# Patient Record
Sex: FEMALE | Race: WHITE | Employment: FULL TIME | URBAN - METROPOLITAN AREA
[De-identification: names, ages, dates, MRNs, and addresses within clinical notes are randomized per-mention and may not be internally consistent; named-entity substitution may affect disease eponyms.]

---

## 2017-10-20 ENCOUNTER — APPOINTMENT (OUTPATIENT)
Dept: GENERAL RADIOLOGY | Facility: CLINIC | Age: 47
End: 2017-10-20
Attending: EMERGENCY MEDICINE
Payer: COMMERCIAL

## 2017-10-20 ENCOUNTER — HOSPITAL ENCOUNTER (EMERGENCY)
Facility: CLINIC | Age: 47
Discharge: HOME | End: 2017-10-20
Attending: EMERGENCY MEDICINE | Admitting: EMERGENCY MEDICINE
Payer: COMMERCIAL

## 2017-10-20 VITALS
SYSTOLIC BLOOD PRESSURE: 131 MMHG | BODY MASS INDEX: 30.24 KG/M2 | HEART RATE: 90 BPM | DIASTOLIC BLOOD PRESSURE: 90 MMHG | RESPIRATION RATE: 16 BRPM | TEMPERATURE: 98.3 F | HEIGHT: 59 IN | OXYGEN SATURATION: 99 % | WEIGHT: 150 LBS

## 2017-10-20 DIAGNOSIS — R07.89 ATYPICAL CHEST PAIN: ICD-10-CM

## 2017-10-20 LAB
ANION GAP SERPL CALCULATED.3IONS-SCNC: 6 MMOL/L (ref 3–14)
BASOPHILS # BLD AUTO: 0.1 10E9/L (ref 0–0.2)
BASOPHILS NFR BLD AUTO: 0.6 %
BUN SERPL-MCNC: 17 MG/DL (ref 7–30)
CALCIUM SERPL-MCNC: 8.9 MG/DL (ref 8.5–10.1)
CHLORIDE SERPL-SCNC: 102 MMOL/L (ref 94–109)
CO2 SERPL-SCNC: 28 MMOL/L (ref 20–32)
CREAT SERPL-MCNC: 0.78 MG/DL (ref 0.52–1.04)
D DIMER PPP FEU-MCNC: <0.3 UG/ML FEU (ref 0–0.5)
DIFFERENTIAL METHOD BLD: ABNORMAL
EOSINOPHIL # BLD AUTO: 0.1 10E9/L (ref 0–0.7)
EOSINOPHIL NFR BLD AUTO: 0.8 %
ERYTHROCYTE [DISTWIDTH] IN BLOOD BY AUTOMATED COUNT: 13.6 % (ref 10–15)
GFR SERPL CREATININE-BSD FRML MDRD: 80 ML/MIN/1.7M2
GLUCOSE SERPL-MCNC: 116 MG/DL (ref 70–99)
HCT VFR BLD AUTO: 36.8 % (ref 35–47)
HGB BLD-MCNC: 12.3 G/DL (ref 11.7–15.7)
IMM GRANULOCYTES # BLD: 0.1 10E9/L (ref 0–0.4)
IMM GRANULOCYTES NFR BLD: 0.4 %
LYMPHOCYTES # BLD AUTO: 4.6 10E9/L (ref 0.8–5.3)
LYMPHOCYTES NFR BLD AUTO: 35.2 %
MCH RBC QN AUTO: 29.9 PG (ref 26.5–33)
MCHC RBC AUTO-ENTMCNC: 33.4 G/DL (ref 31.5–36.5)
MCV RBC AUTO: 89 FL (ref 78–100)
MONOCYTES # BLD AUTO: 1.3 10E9/L (ref 0–1.3)
MONOCYTES NFR BLD AUTO: 9.6 %
NEUTROPHILS # BLD AUTO: 7 10E9/L (ref 1.6–8.3)
NEUTROPHILS NFR BLD AUTO: 53.4 %
NRBC # BLD AUTO: 0 10*3/UL
NRBC BLD AUTO-RTO: 0 /100
PLATELET # BLD AUTO: 318 10E9/L (ref 150–450)
POTASSIUM SERPL-SCNC: 4.2 MMOL/L (ref 3.4–5.3)
RBC # BLD AUTO: 4.12 10E12/L (ref 3.8–5.2)
SODIUM SERPL-SCNC: 136 MMOL/L (ref 133–144)
TROPONIN I SERPL-MCNC: <0.015 UG/L (ref 0–0.04)
WBC # BLD AUTO: 13 10E9/L (ref 4–11)

## 2017-10-20 PROCEDURE — 25000128 H RX IP 250 OP 636: Performed by: EMERGENCY MEDICINE

## 2017-10-20 PROCEDURE — 80048 BASIC METABOLIC PNL TOTAL CA: CPT | Performed by: EMERGENCY MEDICINE

## 2017-10-20 PROCEDURE — 85379 FIBRIN DEGRADATION QUANT: CPT | Performed by: EMERGENCY MEDICINE

## 2017-10-20 PROCEDURE — 99284 EMERGENCY DEPT VISIT MOD MDM: CPT | Mod: 25

## 2017-10-20 PROCEDURE — 96374 THER/PROPH/DIAG INJ IV PUSH: CPT

## 2017-10-20 PROCEDURE — 25000132 ZZH RX MED GY IP 250 OP 250 PS 637: Performed by: EMERGENCY MEDICINE

## 2017-10-20 PROCEDURE — 71020 XR CHEST 2 VW: CPT

## 2017-10-20 PROCEDURE — 85025 COMPLETE CBC W/AUTO DIFF WBC: CPT | Performed by: EMERGENCY MEDICINE

## 2017-10-20 PROCEDURE — 84484 ASSAY OF TROPONIN QUANT: CPT | Performed by: EMERGENCY MEDICINE

## 2017-10-20 PROCEDURE — 93005 ELECTROCARDIOGRAM TRACING: CPT

## 2017-10-20 RX ORDER — KETOROLAC TROMETHAMINE 15 MG/ML
15 INJECTION, SOLUTION INTRAMUSCULAR; INTRAVENOUS ONCE
Status: COMPLETED | OUTPATIENT
Start: 2017-10-20 | End: 2017-10-20

## 2017-10-20 RX ORDER — ASPIRIN 325 MG
325 TABLET ORAL ONCE
Status: COMPLETED | OUTPATIENT
Start: 2017-10-20 | End: 2017-10-20

## 2017-10-20 RX ADMIN — KETOROLAC TROMETHAMINE 15 MG: 15 INJECTION, SOLUTION INTRAMUSCULAR; INTRAVENOUS at 21:32

## 2017-10-20 RX ADMIN — ASPIRIN 325 MG ORAL TABLET 325 MG: 325 PILL ORAL at 21:32

## 2017-10-20 ASSESSMENT — ENCOUNTER SYMPTOMS
COUGH: 1
WHEEZING: 1

## 2017-10-20 NOTE — ED AVS SNAPSHOT
[facility] Emergency Department    [address]    [facility] [address]    Phone:  [phone]    Fax:  [phone]                                       Maria Del Rosario Rod   MRN: [id number]    Department:  [facility] Emergency Department   Date of Visit:  10/20/2017           After Visit Summary Signature Page     I have received my discharge instructions, and my questions have been answered. I have discussed any challenges I see with this plan with the nurse or doctor.    ..........................................................................................................................................  Patient/Patient Representative Signature      ..........................................................................................................................................  Patient Representative Print Name and Relationship to Patient    ..................................................               ................................................  Date                                            Time    ..........................................................................................................................................  Reviewed by Signature/Title    ...................................................              ..............................................  Date                                                            Time

## 2017-10-20 NOTE — ED AVS SNAPSHOT
[facility] Emergency Department    [address] [place] [address]    Phone:  [phone]    Fax:  [phone]                                       Maria Del Rosario Rod   MRN: [id number]    Department:  [facility] Emergency Department   Date of Visit:  10/20/2017           Patient Information     Date Of Birth          1970        Your diagnoses for this visit were:     Atypical chest pain        You were seen by Ronnell Rand MD.      Follow-up Information     Call Jack Carbajal NP.    Specialty:  Nurse Practitioner    Why:  call Monday AM to discuss ED visit and follow-up     Contact information:    [facility] [place]  [address] [place] [address]  [phone]          Follow up with [facility] Emergency Department.    Specialty:  EMERGENCY MEDICINE    Why:  As needed, If symptoms worsen    Contact information:    [address] [place] [id number] [phone]        Discharge Instructions         Chest Wall Pain: Costochondritis    The chest pain that you have had today is caused by costochondritis. This condition is caused by an inflammation of the cartilage joining your ribs to your breastbone. It is not caused by heart or lung problems. Your healthcare team has made sure that the chest pain you feel is not from a life threatening cause of chest pain such as heart attack, collapsed lung, blood clot in the lung, tear in the aorta, or esophageal rupture. The inflammation may have been brought on by a blow to the chest, lifting heavy objects, intense exercise, or an illness that made you cough and sneeze a lot. It often occurs during times of emotional stress. It can be painful, but it is not dangerous. It usually goes away in 1 to 2 weeks. But it may happen again. Rarely, a more serious condition may cause symptoms similar to costochondritis. That s why it s important to watch for the warning signs  listed below.  Home care  Follow these guidelines when caring for yourself at home:    If you feel that emotional stress is a cause of your condition, try to figure out the sources of that stress. It may not be obvious. Learn ways to deal with the stress in your life. This can include regular exercise, muscle relaxation, meditation, or simply taking time out for yourself.    You may use acetaminophen, ibuprofen, or naproxen to control pain, unless another pain medicine was prescribed. If you have liver or kidney disease or ever had a stomach ulcer, talk with your healthcare provider before using these medicines.    You can also help ease pain by using a hot, wet compress or heating pad. Use this with or without a medicated skin cream that helps relieves pain.    Do stretching exercise as advised by your provider.    Take any prescribed medicines as directed.  Follow-up care  Follow up with your healthcare provider, or as advised, if you do not start to get better in the next 2 days.  When to seek medical advice  Call your healthcare provider right away if any of these occur:    A change in the type of pain. Call if it feels different, becomes more serious, lasts longer, or spreads into your shoulder, arm, neck, jaw, or back.    Shortness of breath or pain gets worse when you breathe    Weakness, dizziness, or fainting    Cough with dark-colored sputum (phlegm) or blood    Abdominal pain    Dark red or black stools    Fever of 100.4 F (38 C) or higher, or as directed by your healthcare provider  Date Last Reviewed: 12/1/2016 2000-2017 [employer]. [address] [place] [address], [place] [address]. All rights reserved. This information is not intended as a substitute for professional medical care. Always follow your healthcare professional's instructions.          24 Hour Appointment Hotline       To make an appointment at any [facility], call [phone] ([phone]). If you don't have a family  doctor or clinic, we will help you find one. [facility] (formerly [facility]) are conveniently located to serve the needs of you and your family.             Review of your medicines      Our records show that you are taking the medicines listed below. If these are incorrect, please call your family doctor or clinic.        Dose / Directions Last dose taken    * albuterol (2.5 MG/3ML) 0.083% neb solution   Dose:  1 ampule        Take 1 ampule by nebulization every 6 hours as needed   Refills:  0        * ALBUTEROL INHALER 17GM   Dose:  2 puff        Inhale 2 puffs into the lungs every 4 hours as needed This product no longer available   Refills:  0        ESTERIFIED ESTROGENS PO   Dose:  1 mg        Take 1 mg by mouth   Refills:  0        HYDROcodone-acetaminophen 5-325 MG per tablet   Commonly known as:  NORCO   Dose:  1-2 tablet   Quantity:  30 tablet        Take 1-2 tablets by mouth every 4 hours as needed for moderate to severe pain   Refills:  0        IMITREX PO   Dose:  50 mg   Indication:  Migraine Headache        Take 50 mg by mouth once as needed   Refills:  0        Multi-vitamin Tabs tablet   Dose:  1 tablet        Take 1 tablet by mouth daily   Refills:  0        NEURONTIN PO   Dose:  300 mg        Take 300 mg by mouth daily   Refills:  0        PROTONIX 20 MG EC tablet   Dose:  20 mg   Generic drug:  pantoprazole        Take 20 mg by mouth 2 times daily   Refills:  0        PROZAC PO   Dose:  40 mg        Take 40 mg by mouth daily   Refills:  0        TRAZODONE HCL PO   Dose:  50 mg        Take 50 mg by mouth At Bedtime   Refills:  0        * UNKNOWN TO PATIENT        Antibiotic for UTI   Refills:  0        * UNKNOWN TO PATIENT        Estrogen   Refills:  0        XANAX PO   Dose:  0.25 mg        Take 0.25 mg by mouth every 4 hours as needed for anxiety   Refills:  0        * Notice:  This list has 4 medication(s) that are the same as other medications prescribed for you. Read the directions carefully, and ask your  doctor or other care provider to review them with you.            Procedures and tests performed during your visit     Basic metabolic panel    CBC with platelets differential    Chest XR,  PA & LAT    D dimer quantitative    EKG 12 lead    Troponin I      Orders Needing Specimen Collection     None      Pending Results     Date and Time Order Name Status Description    10/20/2017 2048 EKG 12 lead Preliminary             Pending Culture Results     No orders found from 10/18/2017 to 10/21/2017.            Pending Results Instructions     If you had any lab results that were not finalized at the time of your Discharge, you can call the ED Lab Result RN at [phone]. You will be contacted by this team for any positive Lab results or changes in treatment. The nurses are available 7 days a week from 10A to 6:30P.  You can leave a message 24 hours per day and they will return your call.        Test Results From Your Hospital Stay        10/20/2017  9:41 PM      Component Results     Component Value Ref Range & Units Status    WBC 13.0 (H) 4.0 - 11.0 10e9/L Final    RBC Count 4.12 3.8 - 5.2 10e12/L Final    Hemoglobin 12.3 11.7 - 15.7 g/dL Final    Hematocrit 36.8 35.0 - 47.0 % Final    MCV 89 78 - 100 fl Final    MCH 29.9 26.5 - 33.0 pg Final    MCHC 33.4 31.5 - 36.5 g/dL Final    RDW 13.6 10.0 - 15.0 % Final    Platelet Count 318 150 - 450 10e9/L Final    Diff Method Automated Method  Final    % Neutrophils 53.4 % Final    % Lymphocytes 35.2 % Final    % Monocytes 9.6 % Final    % Eosinophils 0.8 % Final    % Basophils 0.6 % Final    % Immature Granulocytes 0.4 % Final    Nucleated RBCs 0 0 /100 Final    Absolute Neutrophil 7.0 1.6 - 8.3 10e9/L Final    Absolute Lymphocytes 4.6 0.8 - 5.3 10e9/L Final    Absolute Monocytes 1.3 0.0 - 1.3 10e9/L Final    Absolute Eosinophils 0.1 0.0 - 0.7 10e9/L Final    Absolute Basophils 0.1 0.0 - 0.2 10e9/L Final    Abs Immature Granulocytes 0.1 0 - 0.4 10e9/L Final    Absolute  Nucleated RBC 0.0  Final         10/20/2017  9:59 PM      Component Results     Component Value Ref Range & Units Status    Sodium 136 133 - 144 mmol/L Final    Potassium 4.2 3.4 - 5.3 mmol/L Final    Chloride 102 94 - 109 mmol/L Final    Carbon Dioxide 28 20 - 32 mmol/L Final    Anion Gap 6 3 - 14 mmol/L Final    Glucose 116 (H) 70 - 99 mg/dL Final    Urea Nitrogen 17 7 - 30 mg/dL Final    Creatinine 0.78 0.52 - 1.04 mg/dL Final    GFR Estimate 80 >60 mL/min/1.7m2 Final    Non  GFR Calc    GFR Estimate If Black >90 >60 mL/min/1.7m2 Final    African American GFR Calc    Calcium 8.9 8.5 - 10.1 mg/dL Final         10/20/2017  9:59 PM      Component Results     Component Value Ref Range & Units Status    Troponin I ES <0.015 0.000 - 0.045 ug/L Final    The 99th percentile for upper reference range is 0.045 ug/L.  Troponin values   in the range of 0.045 - 0.120 ug/L may be associated with risks of adverse   clinical events.           10/20/2017  9:58 PM      Component Results     Component Value Ref Range & Units Status    D Dimer <0.3 0.0 - 0.50 ug/ml FEU Final    This D-dimer assay is intended for use in conjunction with a clinical pretest   probability assessment model to exclude pulmonary embolism (PE) and deep   venous thrombosis (DVT) in outpatients suspected of PE or DVT. The cut-off   value is 0.5 ug/mL FEU.           10/20/2017 10:30 PM      Narrative     XR CHEST 2 VW   10/20/2017 10:24 PM     HISTORY: Chest pain.    COMPARISON: 9/23/2015.    FINDINGS: Negative chest. No interval change.        Impression     IMPRESSION: Negative.    WILL GRAY MD                Clinical Quality Measure: Blood Pressure Screening     Your blood pressure was checked while you were in the emergency department today. The last reading we obtained was  BP: 134/88 . Please read the guidelines below about what these numbers mean and what you should do about them.  If your systolic blood pressure (the top number)  "is less than 120 and your diastolic blood pressure (the bottom number) is less than 80, then your blood pressure is normal. There is nothing more that you need to do about it.  If your systolic blood pressure (the top number) is 120-139 or your diastolic blood pressure (the bottom number) is 80-89, your blood pressure may be higher than it should be. You should have your blood pressure rechecked within a year by a primary care provider.  If your systolic blood pressure (the top number) is 140 or greater or your diastolic blood pressure (the bottom number) is 90 or greater, you may have high blood pressure. High blood pressure is treatable, but if left untreated over time it can put you at risk for heart attack, stroke, or kidney failure. You should have your blood pressure rechecked by a primary care provider within the next 4 weeks.  If your provider in the emergency department today gave you specific instructions to follow-up with your doctor or provider even sooner than that, you should follow that instruction and not wait for up to 4 weeks for your follow-up visit.        Thank you for choosing [facility]       Thank you for choosing [facility] for your care. Our goal is always to provide you with excellent care. Hearing back from our patients is one way we can continue to improve our services. Please take a few minutes to complete the written survey that you may receive in the mail after you visit with us. Thank you!        [employer] Information     [employer] lets you send messages to your doctor, view your test results, renew your prescriptions, schedule appointments and more. To sign up, go to [email or web address] . Click on \"Log in\" on the left side of the screen, which will take you to the Welcome page. Then click on \"Sign up Now\" on the right side of the page.     You will be asked to enter the access code listed below, as well as some personal information. Please follow the directions to create your username " and password.     Your access code is: [email or web address]  Expires: 2018 10:50 PM     Your access code will  in 90 days. If you need help or a new code, please call your [facility] clinic or [phone].        Care EveryWhere ID     This is your Care EveryWhere ID. This could be used by other organizations to access your [facility] medical records  [id number]        Equal Access to Services     ENZO HAAS : Hadii jose flannery hadasho Soomaali, waaxda luqadaha, qaybta kaalmada adeegyada, graciela urias . So [facility] [phone].    ATENCIÓN: Si habla español, tiene a art disposición servicios gratuitos de asistencia lingüística. Llame al [phone].    We comply with applicable federal civil rights laws and [place] laws. We do not discriminate on the basis of race, color, national origin, age, disability, sex, sexual orientation, or gender identity.            After Visit Summary       This is your record. Keep this with you and show to your community pharmacist(s) and doctor(s) at your next visit.

## 2017-10-21 NOTE — DISCHARGE INSTRUCTIONS
Chest Wall Pain: Costochondritis    The chest pain that you have had today is caused by costochondritis. This condition is caused by an inflammation of the cartilage joining your ribs to your breastbone. It is not caused by heart or lung problems. Your healthcare team has made sure that the chest pain you feel is not from a life threatening cause of chest pain such as heart attack, collapsed lung, blood clot in the lung, tear in the aorta, or esophageal rupture. The inflammation may have been brought on by a blow to the chest, lifting heavy objects, intense exercise, or an illness that made you cough and sneeze a lot. It often occurs during times of emotional stress. It can be painful, but it is not dangerous. It usually goes away in 1 to 2 weeks. But it may happen again. Rarely, a more serious condition may cause symptoms similar to costochondritis. That s why it s important to watch for the warning signs listed below.  Home care  Follow these guidelines when caring for yourself at home:    If you feel that emotional stress is a cause of your condition, try to figure out the sources of that stress. It may not be obvious. Learn ways to deal with the stress in your life. This can include regular exercise, muscle relaxation, meditation, or simply taking time out for yourself.    You may use acetaminophen, ibuprofen, or naproxen to control pain, unless another pain medicine was prescribed. If you have liver or kidney disease or ever had a stomach ulcer, talk with your healthcare provider before using these medicines.    You can also help ease pain by using a hot, wet compress or heating pad. Use this with or without a medicated skin cream that helps relieves pain.    Do stretching exercise as advised by your provider.    Take any prescribed medicines as directed.  Follow-up care  Follow up with your healthcare provider, or as advised, if you do not start to get better in the next 2 days.  When to seek medical  advice  Call your healthcare provider right away if any of these occur:    A change in the type of pain. Call if it feels different, becomes more serious, lasts longer, or spreads into your shoulder, arm, neck, jaw, or back.    Shortness of breath or pain gets worse when you breathe    Weakness, dizziness, or fainting    Cough with dark-colored sputum (phlegm) or blood    Abdominal pain    Dark red or black stools    Fever of 100.4 F (38 C) or higher, or as directed by your healthcare provider  Date Last Reviewed: 12/1/2016 2000-2017 [employer]. [address] [place] [id number]. All rights reserved. This information is not intended as a substitute for professional medical care. Always follow your healthcare professional's instructions.

## 2017-10-21 NOTE — ED PROVIDER NOTES
"  History     Chief Complaint:  Chest Pain    HPI   Maria Del Rosario Rod is a 46 year old female who presents with chest pain to the emergency department. The patient reports experiencing onset of a sharp chest pain at about 1300 today, for which she took Mylanta acid indigestion medication and Vicodin with no improvement. The pain is described as being \"like a knife stabbing\" her in the center of the chest that worsens with certain movements, including movement of left arm. However, she denies worsening of pain with a deep breath in. The patient recalls having a similar pain once before, which did not last long and went away on its own. The current pain has been continuing since onset at 1300 with only about 1 hour of reprieve before continuing again every 5 minutes. She reports that each single episode of pain lasts a few seconds. Of note, the patient reports recently having a URI and a wheezing cough.    Allergies:  Amoxicillin  Compazine  Dilaudid [Hydromorphone]  Oxycodone  Sulfa Drugs     Medications:    Esterified estrogens  Norco  Xanax  Protonix  Prozac  Trazodone  Neurontin  Albuterol   Imitrex   Albuterol      Past Medical History:    Asthma   Fibromyalgia     Past Surgical History:    Arthroscopy left knee   section low transverse  Carpal tunnel release bilateral   DaVinci Salpingo-oophorectomy including bilateral   Hernia repair, incisional  Right inguinal hernia  Hysterectomy   Laparoscopic lysis adhesions  Laparoscopy diagnostic, GYN  Orthopedic surgery, ganglion cystectomy left wrist  Reconstruction abdominal wall      Family History:    History reviewed. No pertinent family history.      Social History:  The patient was accompanied to the ED by her .  Smoking Status: Former Smoker   Smokeless Tobacco: Never Used  Alcohol Use: Positive  Marital Status:        Review of Systems   Respiratory: Positive for cough (resolved) and wheezing (resolved).    Cardiovascular: Positive for chest " "pain.   All other systems reviewed and are negative.      Physical Exam     Patient Vitals for the past 24 hrs:   BP Temp Temp src Pulse Heart Rate Resp SpO2 Height Weight   10/20/17 2303 131/90 - - 90 - 16 99 % - -   10/20/17 2200 134/88 - - - 89 15 96 % - -   10/20/17 2145 133/86 - - - 96 17 95 % - -   10/20/17 2130 132/82 - - - 97 16 94 % - -   10/20/17 2115 127/79 - - - 100 11 95 % - -   10/20/17 2053 141/85 - - - - - - - -   10/20/17 2052 - 98.3  F (36.8  C) Oral 105 - 22 98 % 1.499 m (4' 11\") 68 kg (150 lb)       Physical Exam  Nursing note and vitals reviewed.  Constitutional: Cooperative.   HENT:   Mouth/Throat: Moist mucous membranes.   Eyes: EOMI, nonicteric sclera  Cardiovascular: Normal rate, regular rhythm, no murmurs, rubs, or gallops  Pulmonary/Chest: Effort normal and breath sounds normal. No respiratory distress. No wheezes. No rales.   Abdominal: Soft. Nontender, nondistended, no guarding or rigidity. BS present.   Musculoskeletal: Normal range of motion. Pain to palpation left-sided costochondral border around ribs 6-8. Exacerbated pain with arm adduction.   Neurological: Alert. Moves all extremities spontaneously.   Skin: Skin is warm and dry. No rash noted.   Psychiatric: Normal mood and affect.       Emergency Department Course     ECG:  ECG taken at 2056, ECG read at 2103  Sinus tachycardia  Otherwise normal ECG  Rate 102 bpm. ND interval 122 ms. QRS duration 68 ms. QT/QTc 348/453 ms. P-R-T axes 45 40 7.    Laboratory:  Laboratory findings were communicated with the patient who voiced understanding of the findings.    CBC: WBC 13.0 (H), HGB 12.3,   BMP: Glucose 116 (H) o/w WNL (Creatinine 0.78)  Troponin (Collected 2100): <0.015  D dimer (Collected 2100): <0.3    Interventions:  Aspirin 2132 325 mg PO   Toradol 2132 15 mg IV    Emergency Department Course:    2125 Nursing notes and vitals reviewed.    2125 I performed an exam of the patient as documented above.     2130 IV was inserted " and blood was drawn for laboratory testing, results above.    2223 The patient was sent for a Chest XR, PA & LAT while in the emergency department, results above.      I personally reviewed the laboratory, imaging, and EKG results with the patient and answered all related questions prior to discharge.    Impression & Plan      Medical Decision Making:  Maria Del Rosario Rod is a 46 year old female who presents with chest pain. The differential diagnosis of the patients chest pain includes ACS, angina, pericarditis, PE, pneumonia, pleuritis, dissection, aneurysmal disease, GERD, esophageal spasm, costochondritis/chest wall pain, etc as the most likely etiologies. EKG is non ischemic. Chest x-ray is negative for pneumonia or mediastinal widening. Patient's pain is reproducible on exam. A D-dimer is negative which effectively rules out PE. Pain is improved after IV Toradol. I am most suspicious for musculoskeletal etiology given pain that is exacerbated by movement of her left arm and pain reproduced with palpitation to her left anterior chest. Patient is unable to tolerate NSAIDs and she reports taking narcotic pain medication at home from her fibromyalgia. I reassured her that no emergent process is ongoing at this time and she should follow up with her primary care physician early next week and return over the weekend for any new or worsening symptoms. She is in stable condition at the time of discharge, indications for return to the ED were discussed as well as follow up. All questions were answered and she is in agreement with the plan.     Diagnosis:    ICD-10-CM    1. Atypical chest pain R07.89      Disposition:   The patient is discharged to home.     Discharge Medications:  No discharge medications.     Scribe Disclosure:  I, Maria Victoria Griffin, am serving as a scribe at 9:04 PM on 10/20/2017 to document services personally performed by Ronnell Rand MD, based on my observations and the provider's statements to  me.    [facility] EMERGENCY DEPARTMENT       Ronnell Rand MD  10/23/17 0064

## 2017-10-21 NOTE — ED NOTES
Patient complaining of midsternal chest pain that is stabbing since 1300 today.  Not improved with antiacids.      ABCs intact.  Alert and oriented x 3.

## 2017-10-23 LAB — INTERPRETATION ECG - MUSE: NORMAL
